# Patient Record
Sex: FEMALE | Race: WHITE | NOT HISPANIC OR LATINO
[De-identification: names, ages, dates, MRNs, and addresses within clinical notes are randomized per-mention and may not be internally consistent; named-entity substitution may affect disease eponyms.]

---
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
## 2019-01-11 ENCOUNTER — APPOINTMENT (OUTPATIENT)
Dept: PEDIATRIC ADOLESCENT MEDICINE | Facility: CLINIC | Age: 19
End: 2019-01-11

## 2019-01-11 ENCOUNTER — OUTPATIENT (OUTPATIENT)
Dept: OUTPATIENT SERVICES | Facility: HOSPITAL | Age: 19
LOS: 1 days | Discharge: HOME | End: 2019-01-11

## 2019-01-11 VITALS
HEART RATE: 80 BPM | HEIGHT: 63.39 IN | RESPIRATION RATE: 16 BRPM | SYSTOLIC BLOOD PRESSURE: 110 MMHG | BODY MASS INDEX: 21.35 KG/M2 | WEIGHT: 122 LBS | DIASTOLIC BLOOD PRESSURE: 68 MMHG

## 2019-01-11 DIAGNOSIS — Z70.8 OTHER SEX COUNSELING: ICD-10-CM

## 2019-01-11 RX ORDER — NORGESTIMATE AND ETHINYL ESTRADIOL 7DAYSX3 LO
0.18/0.215/0.25 KIT ORAL DAILY
Refills: 0 | Status: COMPLETED | OUTPATIENT
Start: 2019-01-11

## 2019-01-11 RX ADMIN — NORGESTIMATE AND ETHINYL ESTRADIOL 1 MG-25 MCG: KIT at 00:00

## 2019-01-11 NOTE — HISTORY OF PRESENT ILLNESS
[de-identified] : contraception [FreeTextEntry6] : pt here to continue oral contraceptives. adherent. feels well.

## 2019-05-31 ENCOUNTER — OUTPATIENT (OUTPATIENT)
Dept: OUTPATIENT SERVICES | Facility: HOSPITAL | Age: 19
LOS: 1 days | Discharge: HOME | End: 2019-05-31

## 2019-05-31 ENCOUNTER — APPOINTMENT (OUTPATIENT)
Dept: PEDIATRIC ADOLESCENT MEDICINE | Facility: CLINIC | Age: 19
End: 2019-05-31
Payer: COMMERCIAL

## 2019-05-31 VITALS
DIASTOLIC BLOOD PRESSURE: 64 MMHG | HEIGHT: 63 IN | HEART RATE: 74 BPM | SYSTOLIC BLOOD PRESSURE: 112 MMHG | RESPIRATION RATE: 20 BRPM | BODY MASS INDEX: 23.39 KG/M2 | WEIGHT: 132 LBS

## 2019-05-31 DIAGNOSIS — Z30.41 ENCOUNTER FOR SURVEILLANCE OF CONTRACEPTIVE PILLS: ICD-10-CM

## 2019-05-31 PROCEDURE — 99213 OFFICE O/P EST LOW 20 MIN: CPT

## 2019-05-31 RX ORDER — NORGESTIMATE AND ETHINYL ESTRADIOL 7DAYSX3 LO
0.18/0.215/0.25 KIT ORAL DAILY
Refills: 0 | Status: COMPLETED | OUTPATIENT
Start: 2019-05-31

## 2019-05-31 RX ADMIN — NORGESTIMATE AND ETHINYL ESTRADIOL 1 MG-25 MCG: KIT at 00:00

## 2019-05-31 NOTE — RISK ASSESSMENT
[Has family members/adults to turn to for help] : has family members/adults to turn to for help [Home is free of violence] : home is free of violence [Has peer relationships free of violence] : has peer relationships free of violence [Has had sexual intercourse] : has had sexual intercourse [Displays self-confidence] : displays self-confidence [de-identified] : college

## 2019-05-31 NOTE — DISCUSSION/SUMMARY
[FreeTextEntry1] : dispensed 3 packs of oral contraception. condom use reviewed and dispensed.\par reviewed timing of taking oral contraception since she will be traveling and in different time zone

## 2019-05-31 NOTE — HISTORY OF PRESENT ILLNESS
[de-identified] : contraception [FreeTextEntry6] : pt requesting refill of oral contraception.  adherent to daily medications. consistent condom user. feels well. no complaints\par pt is scheduled to be in [place] for one month study abroad program. leaving tomorrow